# Patient Record
Sex: MALE | Race: OTHER | Employment: PART TIME | URBAN - METROPOLITAN AREA
[De-identification: names, ages, dates, MRNs, and addresses within clinical notes are randomized per-mention and may not be internally consistent; named-entity substitution may affect disease eponyms.]

---

## 2020-11-21 ENCOUNTER — HOSPITAL ENCOUNTER (EMERGENCY)
Age: 25
Discharge: HOME | End: 2020-11-21
Attending: EMERGENCY MEDICINE
Payer: MEDICAID

## 2020-11-21 VITALS
BODY MASS INDEX: 22.5 KG/M2 | WEIGHT: 140 LBS | TEMPERATURE: 97.8 F | DIASTOLIC BLOOD PRESSURE: 83 MMHG | RESPIRATION RATE: 14 BRPM | HEART RATE: 88 BPM | SYSTOLIC BLOOD PRESSURE: 129 MMHG | OXYGEN SATURATION: 97 % | HEIGHT: 66 IN

## 2020-11-21 PROCEDURE — 96374 THER/PROPH/DIAG INJ IV PUSH: CPT

## 2020-11-21 PROCEDURE — 99284 EMERGENCY DEPT VISIT MOD MDM: CPT

## 2020-11-21 PROCEDURE — 6370000000 HC RX 637 (ALT 250 FOR IP): Performed by: EMERGENCY MEDICINE

## 2020-11-21 PROCEDURE — 2500000003 HC RX 250 WO HCPCS: Performed by: EMERGENCY MEDICINE

## 2020-11-21 RX ORDER — FAMOTIDINE 20 MG/1
20 TABLET, FILM COATED ORAL 2 TIMES DAILY
Qty: 10 TABLET | Refills: 0 | Status: SHIPPED | OUTPATIENT
Start: 2020-11-21 | End: 2020-11-26

## 2020-11-21 RX ORDER — PREDNISONE 20 MG/1
60 TABLET ORAL ONCE
Status: COMPLETED | OUTPATIENT
Start: 2020-11-21 | End: 2020-11-21

## 2020-11-21 RX ORDER — PREDNISONE 20 MG/1
20 TABLET ORAL DAILY
Qty: 5 TABLET | Refills: 0 | Status: SHIPPED | OUTPATIENT
Start: 2020-11-21 | End: 2020-11-26

## 2020-11-21 RX ADMIN — FAMOTIDINE 20 MG: 10 INJECTION INTRAVENOUS at 17:05

## 2020-11-21 RX ADMIN — PREDNISONE 60 MG: 20 TABLET ORAL at 17:05

## 2020-11-21 SDOH — HEALTH STABILITY: MENTAL HEALTH: HOW OFTEN DO YOU HAVE A DRINK CONTAINING ALCOHOL?: NEVER

## 2020-11-21 ASSESSMENT — ENCOUNTER SYMPTOMS
WHEEZING: 0
VOMITING: 0
SORE THROAT: 0
DIFFICULTY BREATHING: 0
SINUS PRESSURE: 0
EYE DISCHARGE: 0
ABDOMINAL PAIN: 0
NAUSEA: 0
ALLERGIC REACTION: 1
EYE PAIN: 0
SHORTNESS OF BREATH: 0
DIARRHEA: 0
COUGH: 0
BACK PAIN: 0
EYE REDNESS: 0

## 2020-11-21 NOTE — ED PROVIDER NOTES
49-year-old male presents to the emergency department with concern for allergic reaction. Patient has been battling some sinus congestion and took a pomegranate to help with the symptoms. Shortly after taking it he became very sneezy with his eyes became watering and he took a Benadryl and after taking the Benadryl he noticed his face was swollen. He states he also felt like he was having some mild trouble blurred breathing. He states no fevers no chills no difficulty swallowing difficulty short of breath. He states no other complaints at this time. The history is provided by the patient. Allergic Reaction   Presenting symptoms: rash and swelling    Presenting symptoms: no difficulty breathing and no wheezing    Rash:     Location:  Face  Swelling:     Location:  Face    Onset quality:  Sudden    Duration:  2 hours    Timing:  Intermittent    Progression:  Unchanged    Chronicity:  New  Severity:  Mild  Duration:  2 hours  Prior allergic episodes:  No prior episodes  Context: medications    Relieved by:  Nothing  Worsened by:  Nothing  Ineffective treatments:  Antihistamines       Review of Systems   Constitutional: Negative for chills and fever. HENT: Negative for ear pain, sinus pressure and sore throat. Eyes: Negative for pain, discharge and redness. Respiratory: Negative for cough, shortness of breath and wheezing. Cardiovascular: Negative for chest pain. Gastrointestinal: Negative for abdominal pain, diarrhea, nausea and vomiting. Genitourinary: Negative for dysuria and frequency. Musculoskeletal: Negative for arthralgias and back pain. Skin: Positive for rash. Negative for wound. Neurological: Negative for weakness and headaches. Hematological: Negative for adenopathy. All other systems reviewed and are negative. Physical Exam  Constitutional:       Appearance: Normal appearance. HENT:      Head: Normocephalic and atraumatic. Comments: Facial swelling noted. ------------------------- NURSING NOTES AND VITALS REVIEWED ---------------------------  Date / Time Roomed:  11/21/2020  4:41 PM  ED Bed Assignment:  22/22    The nursing notes within the ED encounter and vital signs as below have been reviewed. /83   Pulse 88   Temp 97.8 °F (36.6 °C) (Oral)   Resp 14   Ht 5' 6\" (1.676 m)   Wt 140 lb (63.5 kg)   SpO2 97%   BMI 22.60 kg/m²   Oxygen Saturation Interpretation: Normal      ------------------------------------------ PROGRESS NOTES ------------------------------------------  I have spoken with the patient and discussed todays results, in addition to providing specific details for the plan of care and counseling regarding the diagnosis and prognosis. Their questions are answered at this time and they are agreeable with the plan. I discussed at length with them reasons for immediate return here for re evaluation. They will followup with their primary care physician by calling their office tomorrow. --------------------------------- ADDITIONAL PROVIDER NOTES ---------------------------------  At this time the patient is without objective evidence of an acute process requiring hospitalization or inpatient management. They have remained hemodynamically stable throughout their entire ED visit and are stable for discharge with outpatient follow-up. The plan has been discussed in detail and they are aware of the specific conditions for emergent return, as well as the importance of follow-up. Discharge Medication List as of 11/21/2020  6:09 PM      START taking these medications    Details   famotidine (PEPCID) 20 MG tablet Take 1 tablet by mouth 2 times daily for 5 days, Disp-10 tablet,R-0Print      predniSONE (DELTASONE) 20 MG tablet Take 1 tablet by mouth daily for 5 days, Disp-5 tablet,R-0Print             Diagnosis:  1.  Allergic reaction, initial encounter        Disposition:  Patient's disposition: Discharge to home  Patient's condition

## 2020-11-21 NOTE — ED NOTES
Pt's brother is requesting to be updated regarding pt's condition when possible. Allan Johnson: [phone].        Grant Macias RN  11/21/20 5745

## 2020-11-21 NOTE — ED NOTES
Bed: 22  Expected date:   Expected time:   Means of arrival:   Comments:  ems     Kristen Ross RN  11/21/20 2975

## 2021-03-20 ENCOUNTER — HOSPITAL ENCOUNTER (EMERGENCY)
Age: 26
Discharge: HOME | End: 2021-03-20
Payer: MEDICAID

## 2021-03-20 VITALS
HEART RATE: 67 BPM | RESPIRATION RATE: 16 BRPM | SYSTOLIC BLOOD PRESSURE: 105 MMHG | OXYGEN SATURATION: 98 % | DIASTOLIC BLOOD PRESSURE: 65 MMHG

## 2021-03-20 DIAGNOSIS — K04.7 DENTAL ABSCESS: Primary | ICD-10-CM

## 2021-03-20 PROCEDURE — 6360000002 HC RX W HCPCS: Performed by: NURSE PRACTITIONER

## 2021-03-20 PROCEDURE — 99284 EMERGENCY DEPT VISIT MOD MDM: CPT

## 2021-03-20 PROCEDURE — 87205 SMEAR GRAM STAIN: CPT

## 2021-03-20 PROCEDURE — 41800 DRAINAGE OF GUM LESION: CPT

## 2021-03-20 PROCEDURE — 96374 THER/PROPH/DIAG INJ IV PUSH: CPT

## 2021-03-20 PROCEDURE — 87102 FUNGUS ISOLATION CULTURE: CPT

## 2021-03-20 PROCEDURE — 87070 CULTURE OTHR SPECIMN AEROBIC: CPT

## 2021-03-20 RX ORDER — METHYLPREDNISOLONE 4 MG/1
TABLET ORAL
Qty: 1 KIT | Refills: 0 | Status: SHIPPED | OUTPATIENT
Start: 2021-03-20 | End: 2021-03-26

## 2021-03-20 RX ORDER — DEXAMETHASONE SODIUM PHOSPHATE 10 MG/ML
8 INJECTION INTRAMUSCULAR; INTRAVENOUS ONCE
Status: COMPLETED | OUTPATIENT
Start: 2021-03-20 | End: 2021-03-20

## 2021-03-20 RX ORDER — ACETAMINOPHEN 325 MG/1
650 TABLET ORAL EVERY 6 HOURS PRN
Qty: 120 TABLET | Refills: 3 | Status: SHIPPED | OUTPATIENT
Start: 2021-03-20 | End: 2022-09-20

## 2021-03-20 RX ORDER — IBUPROFEN 800 MG/1
800 TABLET ORAL EVERY 8 HOURS PRN
Qty: 21 TABLET | Refills: 0 | Status: SHIPPED | OUTPATIENT
Start: 2021-03-20 | End: 2022-06-19 | Stop reason: ALTCHOICE

## 2021-03-20 RX ORDER — AMOXICILLIN AND CLAVULANATE POTASSIUM 875; 125 MG/1; MG/1
1 TABLET, FILM COATED ORAL 2 TIMES DAILY
Qty: 14 TABLET | Refills: 0 | Status: SHIPPED | OUTPATIENT
Start: 2021-03-20 | End: 2021-03-27

## 2021-03-20 RX ADMIN — DEXAMETHASONE SODIUM PHOSPHATE 8 MG: 10 INJECTION INTRAMUSCULAR; INTRAVENOUS at 21:01

## 2021-03-20 NOTE — ED NOTES
Dental resident at bedside     Geovani Hicks, [facility]  03/20/21 5135

## 2021-03-20 NOTE — ED PROVIDER NOTES
There is a abscess measuring 12 x 14 by approximately 17 mm medially and inferiorly to the mandible with surrounding cellulitis. There is some shoddy cervical adenopathy.       Review of Systems:   Pertinent positives and negatives are stated within HPI, all other systems reviewed and are negative.         --------------------------------------------- PAST HISTORY ---------------------------------------------  Past Medical History:  has no past medical history on file. Past Surgical History:  has no past surgical history on file. Social History:  reports that he has been smoking cigarettes. He has never used smokeless tobacco. He reports that he does not drink alcohol or use drugs. Family History: family history is not on file. The patients home medications have been reviewed. Allergies: Patient has no known allergies. ------------------------- NURSING NOTES AND VITALS REVIEWED ---------------------------   The nursing notes within the ED encounter and vital signs as below have been reviewed by myself. There were no vitals taken for this visit. Oxygen Saturation Interpretation: Normal    The patients available past medical records and past encounters were reviewed. Physical exam:  Constitutional: The patient is comfortable, alert and oriented x3, well appearing, non toxic in NAD. Head: Atraumatic and normocephalic. Eyes: No discharge from the eyes the sclerae are normal.  ENT: The oropharynx is normal. No pharyngeal erythema, uvular edema, tonsillar exudates, asymmetry or trismus. Mouth is normal to inspection  With the exception of a pain on percussion of the tooth noted above. There is  evidence of facial asymmetry and abscess formation. Floor of the mouth is soft. No tenderness in the submental or submandibular space. No tongue elevation. Neck: The neck demonstrates normal range of motion. No meningeals signs are present. No stridor. Respiratory/chest: The chest is nontender.

## 2021-03-21 LAB — GRAM STAIN ORDERABLE: NORMAL

## 2021-03-21 NOTE — PROGRESS NOTES
Progress Note  Date: Patient's Name/Date of Birth: Ashok Hutchison / 1995 (27 y.o.)    Date: March 20, 2021      Subjective:   Patient presents to emergency department with complaint of submandibular swelling associated w/ abscessed tooth #17,18. Patient's pain is 7 on scale of 1-10. Current Facility-Administered Medications   Medication Dose Route Frequency Provider Last Rate Last Admin    dexamethasone (DECADRON) injection 8 mg  8 mg Intravenous Once MED Rodriguez - CNP         Current Outpatient Medications   Medication Sig Dispense Refill    amoxicillin-clavulanate (AUGMENTIN) 875-125 MG per tablet Take 1 tablet by mouth 2 times daily for 7 days 14 tablet 0    methylPREDNISolone (MEDROL, NERI,) 4 MG tablet Take by mouth. 1 kit 0    ibuprofen (IBU) 800 MG tablet Take 1 tablet by mouth every 8 hours as needed for Pain 21 tablet 0    acetaminophen (AMINOFEN) 325 MG tablet Take 2 tablets by mouth every 6 hours as needed for Pain 120 tablet 3    famotidine (PEPCID) 20 MG tablet Take 1 tablet by mouth 2 times daily for 5 days 10 tablet 0       No Known Allergies    Objective:  Vitals:    03/20/21 1846   BP: 105/65   Pulse: 67   Resp: 16   SpO2: 98%       Physical Exam  Pt reports no difficulty breathing. Airway is not involved. Left medial and inferior aspects of mandible (submandibular fascial space) has mild swelling that is tender to palpation. Intra Oral Exam reveals: Mild trismus. Severe caries tooth #17,18. Mild intraoral swelling lingual to tooth #18. Tender to palpation. Extraoral exam: Medial and inferior aspect of mandible (submandibular space) has small, firm swelling that is tender to palpation. Assessment:  Active Problems:    * No active hospital problems. *  Resolved Problems:    * No resolved hospital problems. *    Patient diagnosed with: Left submandibular space abscess    Plan:  1. Treatment Plan: Consulted w/ on call oral surgeon, Dr. Anay Dickerson.   1. Incision and drainage, antibiotics, steroids, and report to dental clinic for ext. #17,18 in a timely manner. 2. Treatment Provided:   1. 2 cart 0.5% marcaine w/ 1:100k epi, and 2 cart 2% lido w/ 1:100k epi for local anesthesia. Shallow \"stab\" incision made lingual to tooth #18. Blunt dissection w/ curved hemostats. Good drainage observed. Cultures taken. Irrigation w/ monoject and sterile water. Post op instructions both written and verbally given to pt and pt's brother. Instructions for taking prescription medications given. Pt was dismissed in good condition. 3. Discharge Instructions:   1. Warm salt water rinses, take prescribed medications as instructed, report to dental clinic within 5 days for extraction of tooth #17,18.  4. Prescribed Medications:   1. Augmentin 875mg  2.  Medrol Dose Pack    Electronically signed by Imelda Collado DDS on 3/20/21 at 8:45 PM EDT

## 2021-03-22 LAB
GRAM STAIN RESULT: NORMAL
WOUND/ABSCESS: NORMAL

## 2021-03-24 PROCEDURE — 99283 EMERGENCY DEPT VISIT LOW MDM: CPT

## 2021-03-25 ENCOUNTER — HOSPITAL ENCOUNTER (EMERGENCY)
Age: 26
Discharge: HOME | End: 2021-03-25
Payer: MEDICAID

## 2021-03-25 VITALS
OXYGEN SATURATION: 98 % | TEMPERATURE: 97.5 F | HEART RATE: 78 BPM | WEIGHT: 140 LBS | RESPIRATION RATE: 16 BRPM | SYSTOLIC BLOOD PRESSURE: 111 MMHG | DIASTOLIC BLOOD PRESSURE: 70 MMHG | BODY MASS INDEX: 22.5 KG/M2 | HEIGHT: 66 IN

## 2021-03-25 DIAGNOSIS — K08.409 HISTORY OF THIRD MOLAR TOOTH EXTRACTION, UNSPECIFIED EDENTULISM CLASS: ICD-10-CM

## 2021-03-25 DIAGNOSIS — K08.89 PAIN, DENTAL: Primary | ICD-10-CM

## 2021-03-25 PROCEDURE — 96372 THER/PROPH/DIAG INJ SC/IM: CPT

## 2021-03-25 PROCEDURE — 2500000003 HC RX 250 WO HCPCS: Performed by: NURSE PRACTITIONER

## 2021-03-25 PROCEDURE — 6360000002 HC RX W HCPCS: Performed by: NURSE PRACTITIONER

## 2021-03-25 RX ORDER — KETOROLAC TROMETHAMINE 30 MG/ML
30 INJECTION, SOLUTION INTRAMUSCULAR; INTRAVENOUS ONCE
Status: COMPLETED | OUTPATIENT
Start: 2021-03-25 | End: 2021-03-25

## 2021-03-25 RX ORDER — TRANEXAMIC ACID 100 MG/ML
1000 INJECTION, SOLUTION INTRAVENOUS ONCE
Status: COMPLETED | OUTPATIENT
Start: 2021-03-25 | End: 2021-03-25

## 2021-03-25 RX ORDER — OXYCODONE HYDROCHLORIDE AND ACETAMINOPHEN 5; 325 MG/1; MG/1
1 TABLET ORAL ONCE
Status: DISCONTINUED | OUTPATIENT
Start: 2021-03-25 | End: 2021-03-25 | Stop reason: HOSPADM

## 2021-03-25 RX ADMIN — KETOROLAC TROMETHAMINE 30 MG: 30 INJECTION, SOLUTION INTRAMUSCULAR at 00:28

## 2021-03-25 RX ADMIN — TRANEXAMIC ACID 1000 MG: 1 INJECTION, SOLUTION INTRAVENOUS at 00:29

## 2021-03-25 ASSESSMENT — PAIN DESCRIPTION - LOCATION: LOCATION: MOUTH

## 2021-03-25 NOTE — ED NOTES
Patient given TXA covered gauze to place at dental removal site.  Pt has a hard time opening mouth but placed gauze at this time      Lee Landis RN  03/25/21 0826

## 2021-03-25 NOTE — ED PROVIDER NOTES
Independent    HPI: Bipin Gomez 27 y.o. male with a past medical history of History reviewed. No pertinent past medical history. presents with a complaint of dental pain. The patient states this pain has been gradual in onset, persistent, moderate in severity and worse today which is what prompted the visit. Pain has not been relieved with any OTC medications. Patient denies any unilateral facial swelling. Patient is able to handle their own secretions and drink fluids without difficulty. Patient denies any fever. The patient also denies any history of dental trauma. Denies difficulty breathing or swallowing. The location of the pain and appears to be isolated over tooth number 18. Patient presents emergency department after having tooth extraction. Patient states that he has been having some bleeding. He states he is also having pain. States that he left the dental clinic at 3 PM today. Patient denies take anything for pain relief. Patient also denies placing gauze and biting down on it to help control the bleeding as the areas were not sutured. Patient otherwise denies any airway compromise denies any throat tightening or drooling. Patient also denies any facial swelling.       Review of Systems:   Pertinent positives and negatives are stated within HPI, all other systems reviewed and are negative.         --------------------------------------------- PAST HISTORY ---------------------------------------------  Past Medical History:  has no past medical history on file. Past Surgical History:  has no past surgical history on file. Social History:  reports that he has been smoking cigarettes. He has never used smokeless tobacco. He reports that he does not drink alcohol or use drugs. Family History: family history is not on file. The patients home medications have been reviewed. Allergies: Patient has no known allergies.     ------------------------- NURSING NOTES AND VITALS REVIEWED ---------------------------   The nursing notes within the ED encounter and vital signs as below have been reviewed by myself. /70   Pulse 78   Temp 97.5 °F (36.4 °C) (Temporal)   Resp 16   Ht 5' 6\" (1.676 m)   Wt 140 lb (63.5 kg)   SpO2 98%   BMI 22.60 kg/m²   Oxygen Saturation Interpretation: Normal    The patients available past medical records and past encounters were reviewed. Physical exam:  Constitutional: The patient is comfortable, alert and oriented x3, well appearing, non toxic in NAD. Head: Atraumatic and normocephalic. Eyes: No discharge from the eyes the sclerae are normal.  ENT: The oropharynx is normal. No pharyngeal erythema, uvular edema, tonsillar exudates, asymmetry or trismus. Mouth is normal to inspection  With the exception of a pain on percussion of the tooth noted above. There is no evidence of facial asymmetry or abscess formation. Floor of the mouth is soft. No tenderness in the submental or submandibular space. No tongue elevation. Neck: The neck demonstrates normal range of motion. No meningeals signs are present. No stridor. Respiratory/chest: The chest is nontender. Breath sounds are normal. no respiratory distress is noted  Cardiovascular: Heart shows a regular rate and rhythm no murmurs no rubs no gallops. Skin: The skin exam shows no evidence of rashes  Neuro: GCS is 15  Lymphatic: No cervical lymphadenopathy       -------------------------------------------------- RESULTS -------------------------------------------------  I have personally reviewed all laboratory and imaging results for this patient. Results are listed below. LABS:  No results found for this visit on 03/25/21. RADIOLOGY:  Interpreted by Radiologist.  No orders to display       Plan will be for TXA soaked gauze to be placed at the extraction site. Bleeding is controlled. Patient just was not biting down initially to provide excess pressure.   Patient provided with Toradol 30 mg IM injection he refused to take the Percocet. Extra time spent with patient making him aware of the importance of soft diet, good hydration and taking Motrin or Tylenol for pain relief as well as biting down on gauze and replacing it frequently to control bleeding. Patient does not have any active gross bleeding. Does not have any facial swelling. Lungs are clear throughout, no wheezing no stridor. Patient made aware of good follow-up care as well as returning back to the emergency department  for any return of bleeding or any worsening pain. Patient expressed understanding and safely discharged home.        Medical Decision Making: Exam and history c/w  dental pain without evidence of gross infection. At this time we will  the patient on following up in dental clinic and provide pain relief.       Impression:   1) Dental Pain  2) wisdom tooth extraction    Disposition: Discharge  Condition: Stable             MED Zamorano - CNP  03/25/21 6343    ATTENDING PROVIDER ATTESTATION:     Supervising Physician, on-site, available for consultation, non-participatory in the evaluation or care of this patient         Sia Waldron MD  03/25/21 3629

## 2021-03-25 NOTE — ED TRIAGE NOTES
Patient states that he can not talk due to the pain and can barely open his mouth.  Per pt bleeding but when opened mouth no visible blood at this time

## 2022-03-02 ENCOUNTER — HOSPITAL ENCOUNTER (OUTPATIENT)
Age: 27
Discharge: HOME | End: 2022-03-04

## 2022-03-02 PROCEDURE — 88305 TISSUE EXAM BY PATHOLOGIST: CPT

## 2022-05-31 ENCOUNTER — HOSPITAL ENCOUNTER (EMERGENCY)
Age: 27
Discharge: HOME | End: 2022-05-31
Attending: EMERGENCY MEDICINE
Payer: COMMERCIAL

## 2022-05-31 VITALS
BODY MASS INDEX: 24.99 KG/M2 | DIASTOLIC BLOOD PRESSURE: 70 MMHG | HEIGHT: 65 IN | HEART RATE: 72 BPM | RESPIRATION RATE: 18 BRPM | WEIGHT: 150 LBS | TEMPERATURE: 97.2 F | OXYGEN SATURATION: 99 % | SYSTOLIC BLOOD PRESSURE: 102 MMHG

## 2022-05-31 DIAGNOSIS — T78.40XA ALLERGIC REACTION, INITIAL ENCOUNTER: Primary | ICD-10-CM

## 2022-05-31 PROCEDURE — 99283 EMERGENCY DEPT VISIT LOW MDM: CPT

## 2022-05-31 PROCEDURE — 6370000000 HC RX 637 (ALT 250 FOR IP): Performed by: STUDENT IN AN ORGANIZED HEALTH CARE EDUCATION/TRAINING PROGRAM

## 2022-05-31 RX ORDER — FAMOTIDINE 20 MG/1
20 TABLET, FILM COATED ORAL ONCE
Status: COMPLETED | OUTPATIENT
Start: 2022-05-31 | End: 2022-05-31

## 2022-05-31 RX ORDER — PREDNISONE 20 MG/1
60 TABLET ORAL ONCE
Status: COMPLETED | OUTPATIENT
Start: 2022-05-31 | End: 2022-05-31

## 2022-05-31 RX ORDER — DIPHENHYDRAMINE HCL 25 MG
50 TABLET ORAL ONCE
Status: COMPLETED | OUTPATIENT
Start: 2022-05-31 | End: 2022-05-31

## 2022-05-31 RX ADMIN — FAMOTIDINE 20 MG: 20 TABLET, FILM COATED ORAL at 06:56

## 2022-05-31 RX ADMIN — PREDNISONE 60 MG: 20 TABLET ORAL at 06:56

## 2022-05-31 RX ADMIN — DIPHENHYDRAMINE HCL 50 MG: 25 TABLET ORAL at 06:56

## 2022-05-31 ASSESSMENT — ENCOUNTER SYMPTOMS
DIARRHEA: 0
VOMITING: 0
TROUBLE SWALLOWING: 0
COUGH: 0
COLOR CHANGE: 0
BACK PAIN: 0
SHORTNESS OF BREATH: 1
VOICE CHANGE: 0
NAUSEA: 0
CONSTIPATION: 0
ABDOMINAL PAIN: 0
RECTAL PAIN: 0
WHEEZING: 0

## 2022-05-31 ASSESSMENT — PAIN - FUNCTIONAL ASSESSMENT: PAIN_FUNCTIONAL_ASSESSMENT: NONE - DENIES PAIN

## 2022-05-31 NOTE — ED PROVIDER NOTES
[facility] ENCOUNTER      Pt Name: Darnell Zamora  MRN: [id number]  [place] 1995  Date of evaluation: 5/31/2022      CHIEF COMPLAINT       Chief Complaint   Patient presents with    Rash     woke up with rashes and itchy after drinking cranberry juice last night, it happened last year     Shortness of Breath        HPI  Darnell Zamora is a 32 y.o. male history of food allergies with pumpkin presents with complaints of rash. States he drinks cranberry juice at night 9:00pm.  Woke up with a rash on his upper extremities. States that he felt short of breath when he woke up. States that it has resolved. No relieving or exacerbating factors. Not take any medications meds alleviate symptoms. Had similar episodes before eating pumpkins. Denies new detergents, fevers, chills, difficulty swallowing, abdominal pain, or diarrhea. Except as noted above the remainder of the review of systems was reviewed and negative. Review of Systems   Constitutional: Negative for appetite change, chills, diaphoresis, fatigue, fever and unexpected weight change. HENT: Negative for trouble swallowing and voice change. Eyes: Negative for visual disturbance. Respiratory: Positive for shortness of breath. Negative for cough and wheezing. Cardiovascular: Negative for chest pain. Gastrointestinal: Negative for abdominal pain, constipation, diarrhea, nausea, rectal pain and vomiting. Endocrine: Negative for polyphagia and polyuria. Genitourinary: Negative for dysuria and frequency. Musculoskeletal: Negative for arthralgias and back pain. Skin: Positive for rash. Negative for color change, pallor and wound. Neurological: Negative for weakness and headaches. Hematological: Negative for adenopathy. Psychiatric/Behavioral: Negative for agitation. All other systems reviewed and are negative.        Physical Exam  Vitals and nursing note reviewed. Constitutional:       General: He is not in acute distress. Appearance: Normal appearance. He is not ill-appearing or diaphoretic. Comments: Tolerating oral secretions. Speaking in full sentences. HENT:      Head: Normocephalic and atraumatic. Nose: Nose normal.      Mouth/Throat:      Mouth: Mucous membranes are moist.   Eyes:      Extraocular Movements: Extraocular movements intact. Pupils: Pupils are equal, round, and reactive to light. Cardiovascular:      Rate and Rhythm: Normal rate and regular rhythm. Pulses: Normal pulses. Heart sounds: Normal heart sounds. Pulmonary:      Effort: Pulmonary effort is normal.      Breath sounds: Normal breath sounds. No decreased breath sounds, wheezing or rhonchi. Chest:      Chest wall: No mass. Abdominal:      General: Bowel sounds are normal.      Palpations: Abdomen is soft. Tenderness: There is no abdominal tenderness. There is no right CVA tenderness, left CVA tenderness or rebound. Negative signs include Houston's sign, Rovsing's sign and McBurney's sign. Musculoskeletal:         General: Normal range of motion. Cervical back: Normal range of motion. Skin:     General: Skin is warm and dry. Capillary Refill: Capillary refill takes less than 2 seconds. Findings: Rash present. Comments: Nonpapular, rash on upper extremities. Blanchable. Neurological:      General: No focal deficit present. Mental Status: He is alert and oriented to person, place, and time. Psychiatric:         Mood and Affect: Mood normal.          Procedures     MDM  Number of Diagnoses or Management Options  Diagnosis management comments: 64-year-old male history of food allergies presents with complaints of rash and shortness of breath after eating cranberry juice at 9 PM yesterday evening. Vitals here are within normal limits. Physical exam patient is no acute distress. He is tolerating oral secretions. He is speaking full sentences. He has no tongue swelling. No stridor. Lungs are clear to auscultation bilaterally. Patient has a blanchable rash on his upper extremities. Patient has an allergic reaction. This is not anaphylaxis. Patient will be given p.o. medications. Observed in the emergency room for symptom progression. Patient to be discharged home. Patient is awake alert, hemodynamic stable, afebrile and in no respiratory distress. Discussed with patient plan for close outpatient follow-up with the patient's PCP as well as return precautions and the patient understands and agrees to the plan. Patient was seen with attending. ED Course as of 05/31/22 0713   Tue May 31, 2022   5245 Patient is having an allergic reaction. Not anaphylaxis. Patient will be discharged home. He was recommended not to ingest any of the suspected cause of his allergic reaction. [JV]      ED Course User Index  [JV] Lindy Resendez MD          --------------------------------------------- PAST HISTORY ---------------------------------------------  Past Medical History:  has no past medical history on file. Past Surgical History:  has no past surgical history on file. Social History:  reports that he has been smoking cigarettes. He has never used smokeless tobacco. He reports that he does not drink alcohol and does not use drugs. Family History: family history is not on file. The patients home medications have been reviewed. Allergies: Patient has no known allergies. -------------------------------------------------- RESULTS -------------------------------------------------  Labs:  No results found for this visit on 05/31/22.     ECG:  Please refer to workup tab for EKG read    Radiology:  No orders to display       ------------------------- NURSING NOTES AND VITALS REVIEWED ---------------------------  Date / Time Roomed:  5/31/2022  6:20 AM  ED Bed Assignment:  20/20    The nursing notes within the ED encounter and vital signs as below have been reviewed. /70   Pulse 72   Temp 97.2 °F (36.2 °C) (Oral)   Resp 18   Ht 5' 5\" (1.651 m)   Wt 150 lb (68 kg)   SpO2 99%   BMI 24.96 kg/m²   Oxygen Saturation Interpretation: normal      ------------------------------------------ PROGRESS NOTES ------------------------------------------    I have spoken with the patient and discussed todays results, in addition to providing specific details for the plan of care and counseling regarding the diagnosis and prognosis. Their questions are answered at this time and they are agreeable with the plan. I discussed at length with them reasons for immediate return here for re evaluation. They will followup with their PCP by calling their office tomorrow. --------------------------------- ADDITIONAL PROVIDER NOTES ---------------------------------  At this time the patient is without objective evidence of an acute process requiring hospitalization or inpatient management. They have remained hemodynamically stable throughout their entire ED visit and are stable for discharge with outpatient follow-up. The plan has been discussed in detail and they are aware of the specific conditions for emergent return, as well as the importance of follow-up. New Prescriptions    No medications on file       Diagnosis:  1. Allergic reaction, initial encounter        Disposition:  Patient's disposition: Discharge to home  Patient's condition is stable.       Sergey Ramirez MD  Resident  05/31/22 7259

## 2022-06-19 ENCOUNTER — HOSPITAL ENCOUNTER (EMERGENCY)
Age: 27
Discharge: HOME | End: 2022-06-19
Payer: MEDICAID

## 2022-06-19 ENCOUNTER — APPOINTMENT (OUTPATIENT)
Dept: GENERAL RADIOLOGY | Age: 27
End: 2022-06-19
Payer: MEDICAID

## 2022-06-19 VITALS
SYSTOLIC BLOOD PRESSURE: 123 MMHG | DIASTOLIC BLOOD PRESSURE: 72 MMHG | HEIGHT: 66 IN | RESPIRATION RATE: 14 BRPM | HEART RATE: 69 BPM | OXYGEN SATURATION: 99 % | TEMPERATURE: 97 F | WEIGHT: 140 LBS | BODY MASS INDEX: 22.5 KG/M2

## 2022-06-19 DIAGNOSIS — M77.8 RIGHT HAND TENDONITIS: ICD-10-CM

## 2022-06-19 DIAGNOSIS — M79.641 RIGHT HAND PAIN: Primary | ICD-10-CM

## 2022-06-19 PROCEDURE — 73130 X-RAY EXAM OF HAND: CPT

## 2022-06-19 PROCEDURE — 99283 EMERGENCY DEPT VISIT LOW MDM: CPT

## 2022-06-19 PROCEDURE — 6370000000 HC RX 637 (ALT 250 FOR IP): Performed by: PHYSICIAN ASSISTANT

## 2022-06-19 RX ORDER — IBUPROFEN 800 MG/1
800 TABLET ORAL ONCE
Status: COMPLETED | OUTPATIENT
Start: 2022-06-19 | End: 2022-06-19

## 2022-06-19 RX ORDER — IBUPROFEN 600 MG/1
600 TABLET ORAL 3 TIMES DAILY PRN
Qty: 30 TABLET | Refills: 0 | Status: SHIPPED | OUTPATIENT
Start: 2022-06-19

## 2022-06-19 RX ADMIN — IBUPROFEN 800 MG: 800 TABLET, FILM COATED ORAL at 11:00

## 2022-06-19 ASSESSMENT — PAIN SCALES - GENERAL: PAINLEVEL_OUTOF10: 10

## 2022-06-19 NOTE — ED NOTES
Awaiting final radiologist interpretation of pt imaging;  Pt updated     Joao Query, RN  06/19/22 8901

## 2022-06-19 NOTE — ED PROVIDER NOTES
[facility]                                                                                                                                      Department of Emergency Medicine   ED  Provider Note  Admit Date/RoomTime: 6/19/2022 10:19 AM  ED Room: 35/        HPI:  6/19/22,   Time: 10:41 AM EDT         Dannielle Cain is a 32 y.o. male presenting to the ED for right hand pain, beginning 2 nights ago. The complaint has been persistent, moderate in severity, and worsened by movement of 3/4/5 fingers. .  The patient states that the pain started 1 morning upon awakening. There is no injury, trauma or fall. He is right-handed but cannot think of anything really that might of caused his pain. 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 Patient reports the pain is worse with movement of his fingers. Denies any swelling or redness. No history of prior surgery to this hand for similar episodes. He states that he did not take anything for pain. ROS:     Constitutional: Negative for fever and chills  HENT: Negative for ear pain, sore throat and sinus pressure  Eyes: Negative for pain, discharge and redness  Respiratory:  Negative for shortness of breath, cough and wheezing  Cardiovascular: Negative for CP, edema or palpitations  Gastrointestinal: Negative for nausea, vomiting, diarrhea and abdominal distention  Genitourinary: Negative for dysuria and frequency  Musculoskeletal:  See HPI  Skin: Negative for rash and wound  Neurological: Negative for weakness and headaches  Hematological: Negative for adenopathy    All other systems reviewed and are negative      -------------------------------- PAST HISTORY ----------------------------------  Past Medical History:  has no past medical history on file. Past Surgical History:  has no past surgical history on file. Social History:  reports that he has been smoking cigarettes.  He has never used smokeless tobacco. He reports that he does not drink alcohol and does not use drugs. Family History: family history is not on file. The patients home medications have been reviewed. Allergies: Patient has no known allergies. --------------------------------- RESULTS ------------------------------------------  All laboratory and radiology results have been personally reviewed by myself   LABS:  No results found for this visit on 06/19/22. RADIOLOGY:  Interpreted by Radiologist.  XR HAND RIGHT (MIN 3 VIEWS)    (Results Pending)       ----------------- NURSING NOTES AND VITALS REVIEWED ---------------   The nursing notes within the ED encounter and vital signs as below have been reviewed. /72   Pulse 69   Temp 97 °F (36.1 °C) (Temporal)   Resp 14   Ht 5' 6\" (1.676 m)   Wt 140 lb (63.5 kg)   SpO2 99%   BMI 22.60 kg/m²   Oxygen Saturation Interpretation: Normal      --------------------------------PHYSICAL EXAM------------------------------------      Constitutional/General: Alert and oriented x3, well appearing, non toxic in NAD  Head: NC/AT  Eyes: PERRL, EOMI  Mouth: Oropharynx clear, handling secretions, no trismus  Neck: Supple, full ROM, no meningeal signs  Pulmonary: Lungs clear to auscultation bilaterally, no wheezes, rales, or rhonchi. Not in respiratory distress  Cardiovascular:  Regular rate and rhythm, no murmurs, gallops, or rubs. 2+ distal pulses  Extremities: Moves all extremities x 4. Exam of right hand remarkable for discoloration over dorsal surface 2/3/4/5 fingers due to \"oils\" the patient placed at home. Yellow in color. No visible deformity or erythema. ROM intact but painful. Pt reports pain with both flexion and extension. Skin intact. Good pulses. No palpable pain over right wrist.   Warm and well perfused  Skin: warm and dry without rash  Neurologic: GCS 15,  Intact.   No focal deficits  Psych: Normal Affect      ------------------------ ED COURSE/MEDICAL

## 2022-09-04 ENCOUNTER — HOSPITAL ENCOUNTER (EMERGENCY)
Age: 27
Discharge: HOME | End: 2022-09-04
Attending: EMERGENCY MEDICINE
Payer: MEDICAID

## 2022-09-04 VITALS
RESPIRATION RATE: 18 BRPM | WEIGHT: 146 LBS | OXYGEN SATURATION: 98 % | SYSTOLIC BLOOD PRESSURE: 110 MMHG | HEIGHT: 63 IN | TEMPERATURE: 99.7 F | HEART RATE: 85 BPM | BODY MASS INDEX: 25.87 KG/M2 | DIASTOLIC BLOOD PRESSURE: 68 MMHG

## 2022-09-04 DIAGNOSIS — T78.2XXA ACUTE ANAPHYLAXIS, INITIAL ENCOUNTER: Primary | ICD-10-CM

## 2022-09-04 LAB
ALBUMIN SERPL-MCNC: 4.4 G/DL (ref 3.5–5.2)
ALP BLD-CCNC: 92 U/L (ref 40–129)
ALT SERPL-CCNC: 23 U/L (ref 0–40)
ANION GAP SERPL CALCULATED.3IONS-SCNC: 10 MMOL/L (ref 7–16)
AST SERPL-CCNC: 18 U/L (ref 0–39)
BASOPHILS ABSOLUTE: 0.05 E9/L (ref 0–0.2)
BASOPHILS RELATIVE PERCENT: 0.3 % (ref 0–2)
BILIRUB SERPL-MCNC: 0.5 MG/DL (ref 0–1.2)
BUN BLDV-MCNC: 11 MG/DL (ref 6–20)
CALCIUM SERPL-MCNC: 9.7 MG/DL (ref 8.6–10.2)
CHLORIDE BLD-SCNC: 100 MMOL/L (ref 98–107)
CO2: 25 MMOL/L (ref 22–29)
CREAT SERPL-MCNC: 0.7 MG/DL (ref 0.7–1.2)
EOSINOPHILS ABSOLUTE: 0.58 E9/L (ref 0.05–0.5)
EOSINOPHILS RELATIVE PERCENT: 3.6 % (ref 0–6)
GFR AFRICAN AMERICAN: >60
GFR NON-AFRICAN AMERICAN: >60 ML/MIN/1.73
GLUCOSE BLD-MCNC: 112 MG/DL (ref 74–99)
HCT VFR BLD CALC: 50.3 % (ref 37–54)
HEMOGLOBIN: 17.3 G/DL (ref 12.5–16.5)
IMMATURE GRANULOCYTES #: 0.04 E9/L
IMMATURE GRANULOCYTES %: 0.2 % (ref 0–5)
LYMPHOCYTES ABSOLUTE: 1.98 E9/L (ref 1.5–4)
LYMPHOCYTES RELATIVE PERCENT: 12.3 % (ref 20–42)
MCH RBC QN AUTO: 29.7 PG (ref 26–35)
MCHC RBC AUTO-ENTMCNC: 34.4 % (ref 32–34.5)
MCV RBC AUTO: 86.3 FL (ref 80–99.9)
MONOCYTES ABSOLUTE: 0.61 E9/L (ref 0.1–0.95)
MONOCYTES RELATIVE PERCENT: 3.8 % (ref 2–12)
NEUTROPHILS ABSOLUTE: 12.89 E9/L (ref 1.8–7.3)
NEUTROPHILS RELATIVE PERCENT: 79.8 % (ref 43–80)
PDW BLD-RTO: 12.1 FL (ref 11.5–15)
PLATELET # BLD: 305 E9/L (ref 130–450)
PMV BLD AUTO: 11.4 FL (ref 7–12)
POTASSIUM REFLEX MAGNESIUM: 3.7 MMOL/L (ref 3.5–5)
RBC # BLD: 5.83 E12/L (ref 3.8–5.8)
SODIUM BLD-SCNC: 135 MMOL/L (ref 132–146)
TOTAL PROTEIN: 7.4 G/DL (ref 6.4–8.3)
WBC # BLD: 16.2 E9/L (ref 4.5–11.5)

## 2022-09-04 PROCEDURE — 96374 THER/PROPH/DIAG INJ IV PUSH: CPT

## 2022-09-04 PROCEDURE — A4216 STERILE WATER/SALINE, 10 ML: HCPCS | Performed by: EMERGENCY MEDICINE

## 2022-09-04 PROCEDURE — 96372 THER/PROPH/DIAG INJ SC/IM: CPT

## 2022-09-04 PROCEDURE — 2580000003 HC RX 258: Performed by: EMERGENCY MEDICINE

## 2022-09-04 PROCEDURE — 85025 COMPLETE CBC W/AUTO DIFF WBC: CPT

## 2022-09-04 PROCEDURE — 99284 EMERGENCY DEPT VISIT MOD MDM: CPT

## 2022-09-04 PROCEDURE — 2500000003 HC RX 250 WO HCPCS: Performed by: EMERGENCY MEDICINE

## 2022-09-04 PROCEDURE — 80053 COMPREHEN METABOLIC PANEL: CPT

## 2022-09-04 PROCEDURE — 96375 TX/PRO/DX INJ NEW DRUG ADDON: CPT

## 2022-09-04 PROCEDURE — 6360000002 HC RX W HCPCS: Performed by: EMERGENCY MEDICINE

## 2022-09-04 RX ORDER — DIPHENHYDRAMINE HCL 25 MG
25 TABLET ORAL EVERY 6 HOURS PRN
Qty: 90 TABLET | Refills: 0 | Status: SHIPPED | OUTPATIENT
Start: 2022-09-04 | End: 2022-10-04

## 2022-09-04 RX ORDER — DEXAMETHASONE SODIUM PHOSPHATE 10 MG/ML
10 INJECTION INTRAMUSCULAR; INTRAVENOUS ONCE
Status: COMPLETED | OUTPATIENT
Start: 2022-09-04 | End: 2022-09-04

## 2022-09-04 RX ORDER — EPINEPHRINE 0.3 MG/.3ML
0.3 INJECTION SUBCUTANEOUS
Qty: 1 EACH | Refills: 0 | Status: SHIPPED | OUTPATIENT
Start: 2022-09-04 | End: 2022-09-04

## 2022-09-04 RX ORDER — DIPHENHYDRAMINE HYDROCHLORIDE 50 MG/ML
50 INJECTION INTRAMUSCULAR; INTRAVENOUS ONCE
Status: COMPLETED | OUTPATIENT
Start: 2022-09-04 | End: 2022-09-04

## 2022-09-04 RX ORDER — EPINEPHRINE 1 MG/ML
0.3 INJECTION, SOLUTION, CONCENTRATE INTRAVENOUS ONCE
Status: COMPLETED | OUTPATIENT
Start: 2022-09-04 | End: 2022-09-04

## 2022-09-04 RX ORDER — METHYLPREDNISOLONE 4 MG/1
TABLET ORAL
Qty: 1 KIT | Refills: 0 | Status: SHIPPED | OUTPATIENT
Start: 2022-09-04 | End: 2022-09-10

## 2022-09-04 RX ADMIN — DIPHENHYDRAMINE HYDROCHLORIDE 50 MG: 50 INJECTION INTRAMUSCULAR; INTRAVENOUS at 01:41

## 2022-09-04 RX ADMIN — EPINEPHRINE 0.3 MG: 1 INJECTION, SOLUTION, CONCENTRATE INTRAVENOUS at 01:37

## 2022-09-04 RX ADMIN — DEXAMETHASONE SODIUM PHOSPHATE 10 MG: 10 INJECTION INTRAMUSCULAR; INTRAVENOUS at 01:41

## 2022-09-04 RX ADMIN — FAMOTIDINE 20 MG: 10 INJECTION INTRAVENOUS at 01:41

## 2022-09-04 NOTE — Clinical Note
Bibi Arias was seen and treated in our emergency department on 9/4/2022. He may return to work on 09/07/2022. If you have any questions or concerns, please don't hesitate to call.       José Aguilar, DO

## 2022-09-04 NOTE — ED PROVIDER NOTES
Ana Maria Mccartney is a 32 y.o. male presenting to the ED for allergic reaction, beginning pta ago. The complaint has been intermittent, moderate in severity, and worsened by nothing. It is a 64-year-old male who states he has no past medical history takes no medications. He states earlier in the evening he started developing lip swelling runny nose diffuse itching all throughout his body and urticaria. He noted some mild shortness of breath denied any sensation like his tongue was swelling but his lips were swelling upper and lower. He noted some nausea denied any abdominal pain denied vomiting he denied any cough or cold symptoms denied any chest pain. Denies any fever or chills. He denies any neck pain headache or back pain. He states he has no new soaps shampoos lotions laundry detergent. He cannot attribute anything to the reaction. He presents with a friend. He has not take anything for his symptoms. Review of Systems:   Review of Systems   Unable to perform ROS: Acuity of condition             --------------------------------------------- PAST HISTORY ---------------------------------------------  Past Medical History:  has no past medical history on file. Past Surgical History:  has no past surgical history on file. Social History:  reports that he has been smoking cigarettes. He has never used smokeless tobacco. He reports that he does not drink alcohol and does not use drugs. Family History: family history is not on file. The patients home medications have been reviewed. Allergies: Patient has no known allergies.     -------------------------------------------------- RESULTS -------------------------------------------------  All laboratory and radiology results have been personally reviewed by myself   LABS:  Results for orders placed or performed during the hospital encounter of 09/04/22   CBC with Auto Differential   Result Value Ref Range    WBC 16.2 (H) 4.5 - 11.5 E9/L RBC 5.83 (H) 3.80 - 5.80 E12/L    Hemoglobin 17.3 (H) 12.5 - 16.5 g/dL    Hematocrit 50.3 37.0 - 54.0 %    MCV 86.3 80.0 - 99.9 fL    MCH 29.7 26.0 - 35.0 pg    MCHC 34.4 32.0 - 34.5 %    RDW 12.1 11.5 - 15.0 fL    Platelets 760 984 - 260 E9/L    MPV 11.4 7.0 - 12.0 fL    Neutrophils % 79.8 43.0 - 80.0 %    Immature Granulocytes % 0.2 0.0 - 5.0 %    Lymphocytes % 12.3 (L) 20.0 - 42.0 %    Monocytes % 3.8 2.0 - 12.0 %    Eosinophils % 3.6 0.0 - 6.0 %    Basophils % 0.3 0.0 - 2.0 %    Neutrophils Absolute 12.89 (H) 1.80 - 7.30 E9/L    Immature Granulocytes # 0.04 E9/L    Lymphocytes Absolute 1.98 1.50 - 4.00 E9/L    Monocytes Absolute 0.61 0.10 - 0.95 E9/L    Eosinophils Absolute 0.58 (H) 0.05 - 0.50 E9/L    Basophils Absolute 0.05 0.00 - 0.20 E9/L   Comprehensive Metabolic Panel w/ Reflex to MG   Result Value Ref Range    Sodium 135 132 - 146 mmol/L    Potassium reflex Magnesium 3.7 3.5 - 5.0 mmol/L    Chloride 100 98 - 107 mmol/L    CO2 25 22 - 29 mmol/L    Anion Gap 10 7 - 16 mmol/L    Glucose 112 (H) 74 - 99 mg/dL    BUN 11 6 - 20 mg/dL    Creatinine 0.7 0.7 - 1.2 mg/dL    GFR Non-African American >60 >=60 mL/min/1.73    GFR African American >60     Calcium 9.7 8.6 - 10.2 mg/dL    Total Protein 7.4 6.4 - 8.3 g/dL    Albumin 4.4 3.5 - 5.2 g/dL    Total Bilirubin 0.5 0.0 - 1.2 mg/dL    Alkaline Phosphatase 92 40 - 129 U/L    ALT 23 0 - 40 U/L    AST 18 0 - 39 U/L       RADIOLOGY:  Interpreted by Radiologist.  No orders to display       ------------------------- NURSING NOTES AND VITALS REVIEWED ---------------------------   The nursing notes within the ED encounter and vital signs as below have been reviewed.    /68   Pulse 85   Temp 99.7 °F (37.6 °C) (Oral)   Resp 18   Ht 5' 3\" (1.6 m)   Wt 146 lb (66.2 kg)   SpO2 98%   BMI 25.86 kg/m²   Oxygen Saturation Interpretation: Normal      ---------------------------------------------------PHYSICAL EXAM--------------------------------------    Physical Exam  Vitals reviewed. Constitutional:       General: He is not in acute distress. Appearance: Normal appearance. He is not toxic-appearing. HENT:      Head: Normocephalic and atraumatic. Jaw: There is normal jaw occlusion. Comments: Upper and lower lip swelling, periorbital swelling, no tounge or airway swelling     Right Ear: External ear normal.      Left Ear: External ear normal.      Nose: Nose normal. No congestion. Mouth/Throat:      Mouth: Mucous membranes are moist.      Pharynx: Oropharynx is clear. No posterior oropharyngeal erythema. Eyes:      Extraocular Movements: Extraocular movements intact. Pupils: Pupils are equal, round, and reactive to light. Cardiovascular:      Rate and Rhythm: Normal rate and regular rhythm. Pulses: Normal pulses. Heart sounds: No murmur heard. Pulmonary:      Effort: Pulmonary effort is normal.      Breath sounds: No wheezing or rhonchi. Chest:      Chest wall: No tenderness. Abdominal:      General: Bowel sounds are normal.      Tenderness: There is no abdominal tenderness. There is no right CVA tenderness, left CVA tenderness or guarding. Musculoskeletal:         General: No swelling or deformity. Cervical back: Normal range of motion and neck supple. No muscular tenderness. Skin:     General: Skin is warm and dry. Capillary Refill: Capillary refill takes less than 2 seconds. Findings: Rash present. Rash is urticarial.      Comments: Diffuse urticarial rash   Neurological:      General: No focal deficit present. Mental Status: He is alert and oriented to person, place, and time.    Psychiatric:         Mood and Affect: Mood normal.             ------------------------------ ED COURSE/MEDICAL DECISION MAKING----------------------  Medications   EPINEPHrine PF 1 MG/ML injection (Anaphylaxis) 0.3 mg (0.3 mg IntraMUSCular Given 9/4/22 0137)   diphenhydrAMINE (BENADRYL) injection 50 mg (50 mg IntraVENous Given 9/4/22 0141)   famotidine (PEPCID) 20 mg in sodium chloride (PF) 10 mL injection (20 mg IntraVENous Given 9/4/22 0141)   dexamethasone (DECADRON) injection 10 mg (10 mg IntraVENous Given 9/4/22 0141)         ED COURSE:       Medical Decision Making:    Patient presented with anaphylactic reaction. Unknown to what caused it. He was given EpiPen, IV steroids, IV Benadryl, IV Pepcid, IV fluids. He markedly improved he was monitored for greater than 4 hours. Patient notes symptoms resolving almost completely. He will be discharged home with Medrol Dosepak Benadryl and EpiPen. He is to follow-up with his primary care physician 2 to 3 days for recheck. We discussed warning symptoms for when to return. He verbalized understanding of all instructions. He will have his doctor follow-up on all test results. Risks and benefits were discussed with patient for All medications dispensed and given in department as well prescriptions prescribed for home, . The patient elected to take the medicine. Pt instructed on warning signs and precautions for medication side effects. The patient was given warning signs for when to seek medical attention. Counseled regarding todays diagnosis, including possible risks and complications,  especially if left uncontrolled. Counseled regarding the possible side effects, risks, benefits and alternatives to treatment; patient and/or guardian verbalizes understanding, agrees, feels comfortable with and wishes to proceed with treatment plan. [facility] with any new medication issues, and read all Rx info from pharmacy to assure aware of all possible risks and side effects of medication before taking.     I did discuss warning signs for when to return to the Emergency Room, and the patient verbalized understanding      Critical care:  Please note that the withdrawal or failure to initiate urgent interventions for this patient would likely result in a life threatening deterioration or permanent disability. Accordingly this patient received 30 minutes of critical care time, excluding separately billable procedures. Counseling: The emergency provider has spoken with the patient and discussed todays results, in addition to providing specific details for the plan of care and counseling regarding the diagnosis and prognosis. Questions are answered at this time and they are agreeable with the plan.      --------------------------------- IMPRESSION AND DISPOSITION ---------------------------------    IMPRESSION  1. Acute anaphylaxis, initial encounter        DISPOSITION  Disposition: Discharge to home  Patient condition is good      NOTE: This report was transcribed using voice recognition software.  Every effort was made to ensure accuracy; however, inadvertent computerized transcription errors may be present       Kendra Sanders, DO  09/04/22 4101 Anthony Jones, DO  09/04/22 [id number]

## 2022-09-20 ENCOUNTER — HOSPITAL ENCOUNTER (EMERGENCY)
Age: 27
Discharge: HOME | End: 2022-09-20
Payer: MEDICAID

## 2022-09-20 VITALS
SYSTOLIC BLOOD PRESSURE: 111 MMHG | HEIGHT: 65 IN | RESPIRATION RATE: 16 BRPM | OXYGEN SATURATION: 100 % | BODY MASS INDEX: 23.32 KG/M2 | HEART RATE: 78 BPM | TEMPERATURE: 98.2 F | DIASTOLIC BLOOD PRESSURE: 81 MMHG | WEIGHT: 140 LBS

## 2022-09-20 DIAGNOSIS — K08.89 PAIN, DENTAL: ICD-10-CM

## 2022-09-20 DIAGNOSIS — K04.7 DENTAL INFECTION: Primary | ICD-10-CM

## 2022-09-20 PROCEDURE — 6370000000 HC RX 637 (ALT 250 FOR IP): Performed by: PHYSICIAN ASSISTANT

## 2022-09-20 PROCEDURE — 99283 EMERGENCY DEPT VISIT LOW MDM: CPT

## 2022-09-20 RX ORDER — AMOXICILLIN 250 MG/1
500 CAPSULE ORAL ONCE
Status: COMPLETED | OUTPATIENT
Start: 2022-09-20 | End: 2022-09-20

## 2022-09-20 RX ORDER — ACETAMINOPHEN 500 MG
1000 TABLET ORAL ONCE
Status: COMPLETED | OUTPATIENT
Start: 2022-09-20 | End: 2022-09-20

## 2022-09-20 RX ORDER — ACETAMINOPHEN 500 MG
1000 TABLET ORAL EVERY 8 HOURS PRN
Qty: 30 TABLET | Refills: 0 | Status: SHIPPED | OUTPATIENT
Start: 2022-09-20 | End: 2022-09-25

## 2022-09-20 RX ORDER — CHLORHEXIDINE GLUCONATE 0.12 MG/ML
15 RINSE ORAL 2 TIMES DAILY
Qty: 420 ML | Refills: 0 | Status: SHIPPED | OUTPATIENT
Start: 2022-09-20 | End: 2022-10-04

## 2022-09-20 RX ORDER — AMOXICILLIN 500 MG/1
500 CAPSULE ORAL 2 TIMES DAILY
Qty: 20 CAPSULE | Refills: 0 | Status: SHIPPED | OUTPATIENT
Start: 2022-09-20 | End: 2022-09-30

## 2022-09-20 RX ADMIN — AMOXICILLIN 500 MG: 250 CAPSULE ORAL at 20:55

## 2022-09-20 RX ADMIN — ACETAMINOPHEN 1000 MG: 500 TABLET ORAL at 20:55

## 2022-09-20 ASSESSMENT — PAIN DESCRIPTION - LOCATION: LOCATION: JAW

## 2022-09-20 ASSESSMENT — PAIN SCALES - GENERAL
PAINLEVEL_OUTOF10: 6
PAINLEVEL_OUTOF10: 6

## 2022-09-20 ASSESSMENT — PAIN DESCRIPTION - DESCRIPTORS: DESCRIPTORS: THROBBING;SHARP

## 2022-09-20 ASSESSMENT — PAIN DESCRIPTION - ORIENTATION: ORIENTATION: RIGHT

## 2022-09-21 NOTE — ED PROVIDER NOTES
m) 140 lb (63.5 kg)         Constitutional:  Alert, development consistent with age. HEENT:  NC/NT. Airway patent. Neck:  Supple. Normal ROM. Lips:  upper and lower normal.  Mouth:  normal tongue and buccal mucosa. Dental: #31 noted for cracked and missing dentition without active bleeding, active drainage or obvious foreign body. Tenderness to palpation overlying gumline over right side without palpable abscess. There are signs of gumline infection. Physical Exam                  Trismus: No.         Drooling: No.           Airway stridor: No.  Facial skin: bilateral no wounds, erythema, or swelling. Respiratory:  Clear to auscultation and breath sounds equal.    CV: Regular rate and rhythm, normal heart sounds, without pathological murmurs, ectopy, gallops, or rubs. Skin:  No rashes, erythema or lesions present, unless noted elsewhere. .  Lymphatics: No lymphangitis or adenopathy noted. Neurological:  Oriented. Motor functions intact. Lab / Imaging Results   (All laboratory and radiology results have been personally reviewed by myself)  Labs:  No results found for this visit on 09/20/22. Imaging: All Radiology results interpreted by Radiologist unless otherwise noted. No orders to display     ED Course / Medical Decision Making     Medications   amoxicillin (AMOXIL) capsule 500 mg (500 mg Oral Given 9/20/22 2055)   acetaminophen (TYLENOL) tablet 1,000 mg (1,000 mg Oral Given 9/20/22 2055)        Consult(s):   Dental Resident was not consulted to see patient regarding complaint. Procedure(s):   None    MDM:  Patient presented here for evaluation of right-sided lower dental pain due to no known injury or procedure. Physical exam noted probable infection with several areas of cracked dentition which are appropriate for follow-up via dental clinic. Patient was started on antibiotics and pain medication once in ED setting.   He was sent prescriptions to pharmacy of choice for continued utilization until follow-up with dental clinic. Patient appropriate for discharge to as he is alert and oriented, no acute distress, afebrile, nontachycardic and nonhypoxic. Patient recommended to return to ER with new or worsening symptoms. Patient states he is understanding and agreeable to plan. Plan of Care/Counseling:  LAZARA Varma reviewed today's visit with the patient in addition to providing specific details for the plan of care and counseling regarding the diagnosis and prognosis. Questions are answered at this time and are agreeable with the plan. Assessment      1. Dental infection    2. Pain, dental      Plan   Discharged home. Patient condition is good    New Medications     New Prescriptions    ACETAMINOPHEN (TYLENOL) 500 MG TABLET    Take 2 tablets by mouth every 8 hours as needed for Pain    AMOXICILLIN (AMOXIL) 500 MG CAPSULE    Take 1 capsule by mouth 2 times daily for 10 days    CHLORHEXIDINE (PERIDEX) 0.12 % SOLUTION    Take 15 mLs by mouth 2 times daily for 14 days SWISH AND SPIT. DO NOT SWALLOW. Electronically signed by Juve Varma   DD: 9/20/22  **This report was transcribed using voice recognition software. Every effort was made to ensure accuracy; however, inadvertent computerized transcription errors may be present.   END OF ED PROVIDER NOTE       LAZARA Varma  09/20/22 [address] [place], [place]  09/20/22 6452

## 2024-09-15 ENCOUNTER — HOSPITAL ENCOUNTER (EMERGENCY)
Age: 29
Discharge: HOME | End: 2024-09-15

## 2024-09-15 VITALS
HEIGHT: 65 IN | DIASTOLIC BLOOD PRESSURE: 77 MMHG | OXYGEN SATURATION: 97 % | TEMPERATURE: 98.7 F | RESPIRATION RATE: 13 BRPM | SYSTOLIC BLOOD PRESSURE: 109 MMHG | WEIGHT: 132.28 LBS | BODY MASS INDEX: 22.04 KG/M2 | HEART RATE: 86 BPM

## 2024-09-15 DIAGNOSIS — L50.9 URTICARIA: Primary | ICD-10-CM

## 2024-09-15 DIAGNOSIS — T78.1XXA ALLERGIC REACTION TO TREE NUT: ICD-10-CM

## 2024-09-15 PROCEDURE — 6360000002 HC RX W HCPCS: Performed by: PHYSICIAN ASSISTANT

## 2024-09-15 PROCEDURE — 2580000003 HC RX 258: Performed by: PHYSICIAN ASSISTANT

## 2024-09-15 PROCEDURE — 96372 THER/PROPH/DIAG INJ SC/IM: CPT

## 2024-09-15 PROCEDURE — 96374 THER/PROPH/DIAG INJ IV PUSH: CPT

## 2024-09-15 PROCEDURE — 2500000003 HC RX 250 WO HCPCS: Performed by: PHYSICIAN ASSISTANT

## 2024-09-15 PROCEDURE — 99284 EMERGENCY DEPT VISIT MOD MDM: CPT

## 2024-09-15 PROCEDURE — 96375 TX/PRO/DX INJ NEW DRUG ADDON: CPT

## 2024-09-15 RX ORDER — METHYLPREDNISOLONE SODIUM SUCCINATE 125 MG/2ML
125 INJECTION, POWDER, LYOPHILIZED, FOR SOLUTION INTRAMUSCULAR; INTRAVENOUS ONCE
Status: COMPLETED | OUTPATIENT
Start: 2024-09-15 | End: 2024-09-15

## 2024-09-15 RX ORDER — HYDROXYZINE PAMOATE 25 MG/1
25 CAPSULE ORAL 3 TIMES DAILY PRN
Qty: 21 CAPSULE | Refills: 0 | Status: SHIPPED | OUTPATIENT
Start: 2024-09-15 | End: 2024-09-22

## 2024-09-15 RX ORDER — EPINEPHRINE 0.3 MG/.3ML
0.3 INJECTION SUBCUTANEOUS
Qty: 1 EACH | Refills: 0 | Status: SHIPPED | OUTPATIENT
Start: 2024-09-15 | End: 2024-09-15

## 2024-09-15 RX ORDER — 0.9 % SODIUM CHLORIDE 0.9 %
1000 INTRAVENOUS SOLUTION INTRAVENOUS ONCE
Status: COMPLETED | OUTPATIENT
Start: 2024-09-15 | End: 2024-09-15

## 2024-09-15 RX ORDER — 0.9 % SODIUM CHLORIDE 0.9 %
500 INTRAVENOUS SOLUTION INTRAVENOUS ONCE
Status: DISCONTINUED | OUTPATIENT
Start: 2024-09-15 | End: 2024-09-15

## 2024-09-15 RX ORDER — HYDROXYZINE HYDROCHLORIDE 50 MG/ML
25 INJECTION, SOLUTION INTRAMUSCULAR ONCE
Status: COMPLETED | OUTPATIENT
Start: 2024-09-15 | End: 2024-09-15

## 2024-09-15 RX ORDER — METHYLPREDNISOLONE 4 MG
TABLET, DOSE PACK ORAL
Qty: 21 TABLET | Refills: 0 | Status: SHIPPED | OUTPATIENT
Start: 2024-09-15 | End: 2024-09-21

## 2024-09-15 RX ADMIN — SODIUM CHLORIDE 1000 ML: 9 INJECTION, SOLUTION INTRAVENOUS at 19:14

## 2024-09-15 RX ADMIN — METHYLPREDNISOLONE SODIUM SUCCINATE 125 MG: 125 INJECTION INTRAMUSCULAR; INTRAVENOUS at 19:13

## 2024-09-15 RX ADMIN — HYDROXYZINE HYDROCHLORIDE 25 MG: 50 INJECTION, SOLUTION INTRAMUSCULAR at 19:14

## 2024-09-15 RX ADMIN — FAMOTIDINE 20 MG: 10 INJECTION, SOLUTION INTRAVENOUS at 19:13

## 2024-09-15 ASSESSMENT — PAIN - FUNCTIONAL ASSESSMENT: PAIN_FUNCTIONAL_ASSESSMENT: NONE - DENIES PAIN

## 2024-11-04 ENCOUNTER — HOSPITAL ENCOUNTER (EMERGENCY)
Age: 29
Discharge: HOME | End: 2024-11-05
Attending: EMERGENCY MEDICINE

## 2024-11-04 DIAGNOSIS — T78.40XA ALLERGIC REACTION, INITIAL ENCOUNTER: Primary | ICD-10-CM

## 2024-11-04 PROCEDURE — 2500000003 HC RX 250 WO HCPCS: Performed by: EMERGENCY MEDICINE

## 2024-11-04 PROCEDURE — 2580000003 HC RX 258: Performed by: EMERGENCY MEDICINE

## 2024-11-04 PROCEDURE — 96375 TX/PRO/DX INJ NEW DRUG ADDON: CPT

## 2024-11-04 PROCEDURE — 99283 EMERGENCY DEPT VISIT LOW MDM: CPT

## 2024-11-04 PROCEDURE — 96374 THER/PROPH/DIAG INJ IV PUSH: CPT

## 2024-11-04 PROCEDURE — 6360000002 HC RX W HCPCS: Performed by: EMERGENCY MEDICINE

## 2024-11-04 PROCEDURE — 96372 THER/PROPH/DIAG INJ SC/IM: CPT

## 2024-11-04 RX ORDER — METHYLPREDNISOLONE SODIUM SUCCINATE 125 MG/2ML
125 INJECTION, POWDER, LYOPHILIZED, FOR SOLUTION INTRAMUSCULAR; INTRAVENOUS ONCE
Status: COMPLETED | OUTPATIENT
Start: 2024-11-04 | End: 2024-11-04

## 2024-11-04 RX ORDER — HYDROXYZINE HYDROCHLORIDE 50 MG/ML
50 INJECTION, SOLUTION INTRAMUSCULAR ONCE
Status: COMPLETED | OUTPATIENT
Start: 2024-11-04 | End: 2024-11-04

## 2024-11-04 RX ADMIN — HYDROXYZINE HYDROCHLORIDE 50 MG: 50 INJECTION, SOLUTION INTRAMUSCULAR at 23:14

## 2024-11-04 RX ADMIN — FAMOTIDINE 20 MG: 10 INJECTION, SOLUTION INTRAVENOUS at 23:14

## 2024-11-04 RX ADMIN — METHYLPREDNISOLONE SODIUM SUCCINATE 125 MG: 125 INJECTION INTRAMUSCULAR; INTRAVENOUS at 23:14

## 2024-11-04 ASSESSMENT — LIFESTYLE VARIABLES
HOW OFTEN DO YOU HAVE A DRINK CONTAINING ALCOHOL: NEVER
HOW MANY STANDARD DRINKS CONTAINING ALCOHOL DO YOU HAVE ON A TYPICAL DAY: PATIENT DOES NOT DRINK

## 2024-11-05 VITALS
RESPIRATION RATE: 13 BRPM | SYSTOLIC BLOOD PRESSURE: 97 MMHG | WEIGHT: 140 LBS | HEIGHT: 64 IN | BODY MASS INDEX: 23.9 KG/M2 | OXYGEN SATURATION: 96 % | HEART RATE: 79 BPM | TEMPERATURE: 98.1 F | DIASTOLIC BLOOD PRESSURE: 68 MMHG

## 2024-11-05 PROCEDURE — 96375 TX/PRO/DX INJ NEW DRUG ADDON: CPT

## 2024-11-05 PROCEDURE — 6360000002 HC RX W HCPCS: Performed by: EMERGENCY MEDICINE

## 2024-11-05 RX ORDER — ONDANSETRON 2 MG/ML
4 INJECTION INTRAMUSCULAR; INTRAVENOUS ONCE
Status: COMPLETED | OUTPATIENT
Start: 2024-11-05 | End: 2024-11-05

## 2024-11-05 RX ORDER — PREDNISONE 50 MG/1
50 TABLET ORAL DAILY
Qty: 5 TABLET | Refills: 0 | Status: SHIPPED | OUTPATIENT
Start: 2024-11-05 | End: 2024-11-10

## 2024-11-05 RX ORDER — EPINEPHRINE 0.3 MG/.3ML
0.3 INJECTION SUBCUTANEOUS ONCE
Qty: 0.3 ML | Refills: 0 | Status: SHIPPED | OUTPATIENT
Start: 2024-11-05 | End: 2024-11-05

## 2024-11-05 RX ADMIN — ONDANSETRON 4 MG: 2 INJECTION INTRAMUSCULAR; INTRAVENOUS at 00:10

## 2024-11-05 NOTE — ED PROVIDER NOTES
abnormal. All other labs obtained during this visit were within normal range or not returned as of this dictation.      EKG Interpretation  Interpreted by emergency department physician, Al Warner DO              RADIOLOGY:   Non-plain film images such as CT, Ultrasound and MRI are read by the radiologist. Plain radiographic images are visualized and preliminarily interpreted by the ED Provider with the below findings:        Interpretation per the Radiologist below, if available at the time of this note:    No orders to display     No results found.    No results found.    PROCEDURES   Unless otherwise noted below, none          CRITICAL CARE TIME (.cct)       PAST MEDICAL HISTORY/Chronic Conditions Affecting Care      has no past medical history on file.     EMERGENCY DEPARTMENT COURSE    Vitals:    Vitals:    11/04/24 2324 11/04/24 2339 11/04/24 2354 11/05/24 0009   BP:   97/68    Pulse: 97 82 76 79   Resp: 22 18 14 13   Temp:       TempSrc:       SpO2: 93% 97% 97% 96%   Weight:       Height:           Patient was given the following medications:  Medications   methylPREDNISolone sodium succ (SOLU-MEDROL) injection 125 mg (125 mg IntraVENous Given 11/4/24 2314)   famotidine (PEPCID) 20 mg in sodium chloride (PF) 0.9 % 10 mL injection (20 mg IntraVENous Given 11/4/24 2314)   hydrOXYzine (VISTARIL) injection 50 mg (50 mg IntraMUSCular Given 11/4/24 2314)   ondansetron (ZOFRAN) injection 4 mg (4 mg IntraVENous Given 11/5/24 0010)               Medical Decision Making/Differential Diagnosis:    CC/HPI Summary, Social Determinants of health, Records Reviewed, DDx, testing done/not done, ED Course, Reassessment, disposition considerations/shared decision making with patient, consults, disposition:            Medical Decision Making  Risk  Prescription drug management.        This is a 29-year-old male presented to the ED for an allergic reaction.  Upon arrival to the ED patient's vital signs are stable.

## 2024-12-25 ENCOUNTER — HOSPITAL ENCOUNTER (EMERGENCY)
Age: 29
Discharge: HOME | End: 2024-12-25
Attending: EMERGENCY MEDICINE
Payer: COMMERCIAL

## 2024-12-25 VITALS
TEMPERATURE: 97.9 F | DIASTOLIC BLOOD PRESSURE: 72 MMHG | RESPIRATION RATE: 18 BRPM | SYSTOLIC BLOOD PRESSURE: 108 MMHG | BODY MASS INDEX: 28.7 KG/M2 | HEART RATE: 92 BPM | WEIGHT: 162 LBS | HEIGHT: 63 IN | OXYGEN SATURATION: 98 %

## 2024-12-25 DIAGNOSIS — T78.40XA ALLERGIC REACTION, INITIAL ENCOUNTER: Primary | ICD-10-CM

## 2024-12-25 PROCEDURE — 96374 THER/PROPH/DIAG INJ IV PUSH: CPT

## 2024-12-25 PROCEDURE — 2500000003 HC RX 250 WO HCPCS

## 2024-12-25 PROCEDURE — 6360000002 HC RX W HCPCS

## 2024-12-25 PROCEDURE — 2580000003 HC RX 258

## 2024-12-25 PROCEDURE — 99284 EMERGENCY DEPT VISIT MOD MDM: CPT

## 2024-12-25 PROCEDURE — 96372 THER/PROPH/DIAG INJ SC/IM: CPT

## 2024-12-25 PROCEDURE — 96375 TX/PRO/DX INJ NEW DRUG ADDON: CPT

## 2024-12-25 RX ORDER — HYDROXYZINE HYDROCHLORIDE 50 MG/ML
50 INJECTION, SOLUTION INTRAMUSCULAR ONCE
Status: COMPLETED | OUTPATIENT
Start: 2024-12-25 | End: 2024-12-25

## 2024-12-25 RX ORDER — PREDNISONE 50 MG/1
50 TABLET ORAL DAILY
Qty: 5 TABLET | Refills: 0 | Status: SHIPPED | OUTPATIENT
Start: 2024-12-25 | End: 2024-12-30

## 2024-12-25 RX ORDER — 0.9 % SODIUM CHLORIDE 0.9 %
1000 INTRAVENOUS SOLUTION INTRAVENOUS ONCE
Status: COMPLETED | OUTPATIENT
Start: 2024-12-25 | End: 2024-12-25

## 2024-12-25 RX ORDER — METHYLPREDNISOLONE SODIUM SUCCINATE 125 MG/2ML
125 INJECTION INTRAMUSCULAR; INTRAVENOUS ONCE
Status: COMPLETED | OUTPATIENT
Start: 2024-12-25 | End: 2024-12-25

## 2024-12-25 RX ADMIN — SODIUM CHLORIDE 1000 ML: 9 INJECTION, SOLUTION INTRAVENOUS at 21:46

## 2024-12-25 RX ADMIN — METHYLPREDNISOLONE SODIUM SUCCINATE 125 MG: 125 INJECTION INTRAMUSCULAR; INTRAVENOUS at 21:47

## 2024-12-25 RX ADMIN — HYDROXYZINE HYDROCHLORIDE 50 MG: 50 INJECTION, SOLUTION INTRAMUSCULAR at 21:47

## 2024-12-25 RX ADMIN — FAMOTIDINE 20 MG: 10 INJECTION, SOLUTION INTRAVENOUS at 21:47

## 2024-12-26 NOTE — ED PROVIDER NOTES
[place] [facility] DEPART[facility]  EMERGENCY DEPARTMENT ENCOUNTER        Pt Name: Rivera López  MRN: [id number]  Birthdate 1995  Date of evaluation: 12/25/2024  Provider: Nikkie Diop MD  PCP: Yunier Zimmerman MD  Note Started: 8:53 PM EST 12/25/24    CHIEF COMPLAINT       Chief Complaint   Patient presents with    Allergic Reaction     hives, sob after eating cookies (raisin)         HISTORY OF PRESENT ILLNESS: 1 or more Elements   History From: patient    Limitations to history : None    Rivera López is a 29 y.o. male who presents for allergic reaction.  Patient states he had a cookie that had walnuts in it at about 8 PM.  He is allergic to other nuts.  He endorses rash as well as shortness of breath.  Denies throat swelling, tongue swelling, lip swelling, wheezing.  He has needed epinephrine in the past.  Denies other associated symptoms.  He does report allergy to Benadryl and was told not to take it after he took it with his previous allergic reaction     Nursing Notes were all reviewed and agreed with or any disagreements were addressed in the HPI.        REVIEW OF EXTERNAL NOTE :       Patient was last seen on 11/4/2024 for allergic reaction    REVIEW OF SYSTEMS :           Positives and Pertinent negatives as per HPI.     SURGICAL HISTORY   No past surgical history on file.    CURRENTMEDICATIONS       Discharge Medication List as of 12/25/2024 10:36 PM        CONTINUE these medications which have NOT CHANGED    Details   EPINEPHrine (EPIPEN 2-NERI) 0.3 MG/0.3ML SOAJ injection Inject 0.3 mLs into the muscle once for 1 dose Use as directed for allergic reaction, Disp-0.3 mL, R-0Normal      acetaminophen (TYLENOL) 500 MG tablet Take 2 tablets by mouth every 8 hours as needed for Pain, Disp-30 tablet, R-0Normal      famotidine (PEPCID) 20 MG tablet Take 1 tablet by mouth 2 times daily for 5 days, Disp-10 tablet,R-0Print             ALLERGIES     Benadryl